# Patient Record
Sex: MALE | Race: WHITE
[De-identification: names, ages, dates, MRNs, and addresses within clinical notes are randomized per-mention and may not be internally consistent; named-entity substitution may affect disease eponyms.]

---

## 2019-09-20 ENCOUNTER — HOSPITAL ENCOUNTER (EMERGENCY)
Dept: HOSPITAL 25 - ED | Age: 54
Discharge: HOME | End: 2019-09-20
Payer: COMMERCIAL

## 2019-09-20 VITALS — SYSTOLIC BLOOD PRESSURE: 145 MMHG | DIASTOLIC BLOOD PRESSURE: 95 MMHG

## 2019-09-20 DIAGNOSIS — R19.7: ICD-10-CM

## 2019-09-20 DIAGNOSIS — Z88.4: ICD-10-CM

## 2019-09-20 DIAGNOSIS — R11.0: ICD-10-CM

## 2019-09-20 DIAGNOSIS — N20.1: Primary | ICD-10-CM

## 2019-09-20 LAB
ALBUMIN SERPL BCG-MCNC: 4.4 G/DL (ref 3.2–5.2)
ALBUMIN/GLOB SERPL: 2.1 {RATIO} (ref 1–3)
ALP SERPL-CCNC: 47 U/L (ref 34–104)
ALT SERPL W P-5'-P-CCNC: 43 U/L (ref 7–52)
ANION GAP SERPL CALC-SCNC: 5 MMOL/L (ref 2–11)
AST SERPL-CCNC: 22 U/L (ref 13–39)
BASOPHILS # BLD AUTO: 0 10^3/UL (ref 0–0.2)
BUN SERPL-MCNC: 19 MG/DL (ref 6–24)
BUN/CREAT SERPL: 13.8 (ref 8–20)
CALCIUM SERPL-MCNC: 9.3 MG/DL (ref 8.6–10.3)
CHLORIDE SERPL-SCNC: 108 MMOL/L (ref 101–111)
EOSINOPHIL # BLD AUTO: 0 10^3/UL (ref 0–0.6)
GLOBULIN SER CALC-MCNC: 2.1 G/DL (ref 2–4)
GLUCOSE SERPL-MCNC: 119 MG/DL (ref 70–100)
HCO3 SERPL-SCNC: 28 MMOL/L (ref 22–32)
HCT VFR BLD AUTO: 43 % (ref 42–52)
HGB BLD-MCNC: 14.8 G/DL (ref 14–18)
LYMPHOCYTES # BLD AUTO: 0.8 10^3/UL (ref 1–4.8)
MCH RBC QN AUTO: 32 PG (ref 27–31)
MCHC RBC AUTO-ENTMCNC: 35 G/DL (ref 31–36)
MCV RBC AUTO: 93 FL (ref 80–94)
MONOCYTES # BLD AUTO: 0.6 10^3/UL (ref 0–0.8)
NEUTROPHILS # BLD AUTO: 11.5 10^3/UL (ref 1.5–7.7)
NRBC # BLD AUTO: 0 10^3/UL
NRBC BLD QL AUTO: 0
PLATELET # BLD AUTO: 277 10^3/UL (ref 150–450)
POTASSIUM SERPL-SCNC: 4.1 MMOL/L (ref 3.5–5)
PROT SERPL-MCNC: 6.5 G/DL (ref 6.4–8.9)
RBC # BLD AUTO: 4.6 10^6 /UL (ref 4.18–5.48)
RBC UR QL AUTO: (no result)
SODIUM SERPL-SCNC: 141 MMOL/L (ref 135–145)
WBC # BLD AUTO: 13 10^3/UL (ref 3.5–10.8)
WBC UR QL AUTO: (no result)

## 2019-09-20 PROCEDURE — 81003 URINALYSIS AUTO W/O SCOPE: CPT

## 2019-09-20 PROCEDURE — 83605 ASSAY OF LACTIC ACID: CPT

## 2019-09-20 PROCEDURE — 99283 EMERGENCY DEPT VISIT LOW MDM: CPT

## 2019-09-20 PROCEDURE — 74018 RADEX ABDOMEN 1 VIEW: CPT

## 2019-09-20 PROCEDURE — 86140 C-REACTIVE PROTEIN: CPT

## 2019-09-20 PROCEDURE — 81015 MICROSCOPIC EXAM OF URINE: CPT

## 2019-09-20 PROCEDURE — 85025 COMPLETE CBC W/AUTO DIFF WBC: CPT

## 2019-09-20 PROCEDURE — 74177 CT ABD & PELVIS W/CONTRAST: CPT

## 2019-09-20 PROCEDURE — 87086 URINE CULTURE/COLONY COUNT: CPT

## 2019-09-20 PROCEDURE — 96374 THER/PROPH/DIAG INJ IV PUSH: CPT

## 2019-09-20 PROCEDURE — 96361 HYDRATE IV INFUSION ADD-ON: CPT

## 2019-09-20 PROCEDURE — 36415 COLL VENOUS BLD VENIPUNCTURE: CPT

## 2019-09-20 PROCEDURE — 80053 COMPREHEN METABOLIC PANEL: CPT

## 2019-09-20 PROCEDURE — 96375 TX/PRO/DX INJ NEW DRUG ADDON: CPT

## 2019-09-20 PROCEDURE — 96376 TX/PRO/DX INJ SAME DRUG ADON: CPT

## 2019-09-20 NOTE — HP
CC:  Dr. Hair; Dr. Bauer

 

ADMITTING HISTORY AND PHYSICAL:

 

DATE OF ADMISSION:  09/23/19

 

ADMITTING DIAGNOSES:

1.  Calculus, left ureteropelvic junction.

2.  Left hydronephrosis.

 

PLANNED PROCEDURE:  Left stent insertion and shockwave lithotripsy of calculus, left ureter.

 

SURGEON:  Dr. Bauer.

 

HISTORY OF PRESENT ILLNESS:  Mann Macias is a 53-year-old gentleman who has had increasing left fla
nk pain for the last several days associated with nausea and chills.  A CT scan revealed an approxima
tely 8 mm calculus in the area of the left ureteropelvic junction with left hydronephrosis.

 

PAST MEDICAL HISTORY:  Significant for:

1.  Gout.

2.  Environmental allergies.

 

PAST SURGICAL HISTORY:  Significant for vasectomy and removal of basal cell carcinoma from face.

 

MEDICATIONS ON ADMISSION:

1.  Singulair 1 tablet daily.

2.  Multiple vitamins and supplements.

 

ALLERGIES:  LIDOCAINE.

 

FAMILY HISTORY:  Negative for stones.

 

SOCIAL HISTORY:  Smoking history, nonsmoker.

 

REVIEW OF SYSTEMS:  Otherwise in excellent health.  There is no history of diabetes mellitus or any o
ther major systemic illness.

 

                               PHYSICAL EXAMINATION

 

GENERAL:  Reveals a pleasant, middle-aged gentleman.

 

VITAL SIGNS:  Blood pressure is 140/80, pulse 83 per minute and regular, temperature 97, oxygen satur
ation 98% on room air.

 

LUNGS:  Clear bilaterally.

 

CARDIOVASCULAR:  Regular rate and rhythm.  S1, S2.

 

ABDOMEN:  Soft with left flank tenderness.

 

 DIAGNOSTIC STUDIES/LAB DATA:  I reviewed the CT scan in the lab and had a detailed discussion with AMANDA mcclellan and Mrs. Macias regarding the diagnosis and treatment option.

 

IMPRESSION AND PLAN:  I discussed the procedure of left stent insertion and shockwave lithotripsy in 
detail.  I also discussed the small possibility that this may represent a uric acid stone (difficult 
to tell right now because of the intravenous contrast that was administered for his CAT scan) and in 
that case the plan will be a left stent insertion followed by medication to increase the urine pH to 
try to dissolve the calculus.  The most likely scenario is a calcium stone, in which case the plan wo
uld be left stent insertion and shockwave lithotripsy.  This decision will be made based on a KUB x-r
ay to be obtained prior to the procedure on Monday.

 

 

 

[id number]/CPS #: [id number]

## 2019-09-20 NOTE — XMS REPORT
Continuity of Care Document (CCD)

 Created on:2019



Patient:Mann Macias

Sex:Male

:1965

External Reference #:[id number]





Demographics







 Address  [address] [place] [address]

 

 Home Phone  [phone]

 

 Email Address  [email or web address]

 

 Preferred Language  en

 

 Marital Status  Not  or 

 

 Jainism Affiliation  Unknown

 

 Race  White

 

 Ethnic Group  Not  or 









Author







 Name  Awa Suh DNP, RN, FNP-BC (transmitted by agent of provider Haley Martínez)

 

 Address  [address], [address]



   [place] [address]









Care Team Providers







 Name  Role  Phone

 

 Cristóbal Hair MD - Family Medicine  Care Team Information   [phone]
[phone]









Problems







 Active Problems  Provider  Date

 

 Obstructive sleep apnea of adult  Awa Suh DNP, RN, FNP-BC  Onset: 







Social History







 Type  Date  Description  Comments

 

 Birth Sex    Unknown  

 

 ETOH Use    Drinks Alcoholic Beverages  



     Occasionally  

 

 Tobacco Use  Start: Unknown  Patient has never smoked  

 

 Recreational Drug Use    Denies Drug Use  

 

 Smoking Status  Reviewed: 19  Patient has never smoked  

 

 Exercise Type/Frequency    Does not exercise  







Allergies, Adverse Reactions, Alerts







 Description

 

 No Known Drug Allergies







Medications







 Active Medications  SIG  Qnty  Indications  Ordering Provider  Date

 

 Allegra Allergy  as directed by      Unknown  2016



   mouth        

 

 Claritin  as directed by      Unknown  2016



   mouth        

 

 Multi Vitamin Mens  every day by mouth      Unknown  2016

 

 Magnesium        Unknown  

 

 Glucosamine        Unknown  



 Chondroitin Complex          







Immunizations







 Description

 

 No Information Available







Vital Signs







 Date  Vital  Result  Comment

 

 2019  1:46pm  Height  68 inches  5'8"









 Weight  196.38 lb  

 

 Heart Rate  64 /min  

 

 BP Systolic Sitting  122 mmHg  Lue large cuff

 

 BP Diastolic Sitting  80 mmHg  Lue large cuff

 

 Respiratory Rate  16 /min  

 

 O2 % BldC Oximetry  97 %  On Ra

 

 BMI (Body Mass Index)  29.9 kg/m2  









 2018  1:33pm  Height  68 inches  5'8"









 Weight  194.00 lb  

 

 Heart Rate  64 /min  

 

 BP Systolic Sitting  148 mmHg  Lue reg cuff

 

 BP Diastolic Sitting  80 mmHg  Lue reg cuff

 

 Respiratory Rate  16 /min  

 

 O2 % BldC Oximetry  97 %  On Ra

 

 BMI (Body Mass Index)  29.5 kg/m2  







Results







 Description

 

 No Information Available







Procedures







 Description

 

 No Information Available







Medical Devices







 Description

 

 No Information Available







Encounters







 Description

 

 No Information Available







Assessments







 Date  Code  Description  Provider

 

 2019  G47.33  Obstructive sleep apnea (adult)  Awa Suh DNP, RN, 
FNP-BC



     (pediatric)  







Plan of Treatment

2019 - Awa Suh DNP, RN, FNP-BCG47.33 Obstructive sleep apnea (
adult) (pediatric)Comments:Sleep Apnea - NPSG  3/2/09  wt 185  BMI 28   AHI 52.3
/hour,  oxygen lisa 87% On CPAP AHI 0.9/hour, normalFollow up:1 
yearRecommendations:Continue PAP device, Benefitting and compliant with 
treatment.  Cleaning Wipe off mask daily (baby wipe-no scent, or warm water) 
Clean mask, tubing, filter, and water chamber weekly in mild no scent dish soap 
and water. Hang to dry.    If you have any sleepiness while driving you MUST 
avoid operating a vehicle or machinery. If you have difficulty with your 
equipment, or need to replace your mask or hoses, please contact your homecare 
agency. A weight change of 20 pounds or more may have an effect onyour equipment
; if you are experiencing problems please call for an appointment.  If you have 
any further questions, please call the [facility] at [phone].



Functional Status







 Description

 

 No Information Available







Mental Status







 Description

 

 No Information Available







Referrals







 Description

 

 No Information Available

## 2019-09-20 NOTE — ED
Abdominal Pain/Male





- HPI Summary


HPI Summary: 


Patient is a 53-year-old male who presents emergency department for left-sided 

abdominal pain that started yesterday.  Patient notes pain is sharp and 

cramping in nature and constant.  No modifying factors.  Associated symptoms of 

nausea and diarrhea.  Denies fever, chest pain, shortness of breath, urinary 

symptoms, testicular pain.  No past medical history.  Symptoms are moderate in 

severity.








- History of Current Complaint


Chief Complaint: EDAbdPain


Stated Complaint: ABD PAIN PER PT


Time Seen by Provider: 09/20/19 06:30


Hx Obtained From: Patient


Pain Intensity: 8





- Allergies/Home Medications


Allergies/Adverse Reactions: 


 Allergies











Allergy/AdvReac Type Severity Reaction Status Date / Time


 


lidocaine Allergy  Dizziness Verified 09/20/19 06:18











Home Medications: 


 Home Medications





Glucosam/Chondr/Collagn/Hyalur [Glucosamine & Chondroitin Cap] 1 tab PO DAILY 09 /20/19 [History Confirmed 09/20/19]


Magnesium Oxide [Magnesium] 250 mg PO DAILY 09/20/19 [History Confirmed 09/20/19

]


Montelukast Sodium 10 mg PO DAILY 09/20/19 [History Confirmed 09/20/19]











PMH/Surg Hx/FS Hx/Imm Hx


Previously Healthy: Yes


Endocrine/Hematology History: 


   Denies: Hx Diabetes


Cardiovascular History: 


   Denies: Hx Hypertension, Hx Pacemaker/ICD


Respiratory History: Reports: Hx Seasonal Allergies, Hx Sleep Apnea


 History: 


   Denies: Hx Renal Disease


Sensory History: 


   Denies: Hx Hearing Aid


Neurological History: 


   Denies: Other Neuro Impairments/Disorders


Psychiatric History: 


   Denies: Hx Panic Disorder





- Cancer History


Cancer Type, Location and Year: BASAL CELL -SKIN - S/P 3 YRS





- Surgical History


Surgery Procedure, Year, and Place: Lateral Internal Sphincterotomy


Infectious Disease History: No


Infectious Disease History: 


   Denies: Traveled Outside the [place] in Last 30 Days





- Family History


Known Family History: Positive: Non-Contributory





- Social History


Occupation: Employed Full-time


Lives: With Family





Review of Systems


Positive: Chills.  Negative: Fever


Cardiovascular: Negative


Respiratory: Negative


Positive: Abdominal Pain, Diarrhea, Nausea.  Negative: Vomiting


Genitourinary: Negative


Negative: dysuria, flank pain


Skin: Negative


Neurological: Negative


All Other Systems Reviewed And Are Negative: Yes





Physical Exam


Triage Information Reviewed: Yes


Vital Signs On Initial Exam: 


 Initial Vitals











Temp Pulse Resp BP Pulse Ox


 


 98.1 F   72   18   164/99   97 


 


 09/20/19 06:16  09/20/19 06:16  09/20/19 06:16  09/20/19 06:16  09/20/19 06:16











Vital Signs Reviewed: Yes


Appearance: Positive: Well-Appearing - Pt. lying in bed in NAD. Wife present.


Skin: Positive: Warm, Dry


Head/Face: Positive: Normal Head/Face Inspection


Eyes: Positive: Normal, EOMI


Neck: Positive: Supple


Respiratory/Lung Sounds: Positive: Clear to Auscultation, Breath Sounds Present


Cardiovascular: Positive: Normal, RRR


Abdomen Description: Positive: Other: - Abd. is soft with pain on palpation to 

LLQ and mild left CVA tenderness. No rebound or guarding.


Musculoskeletal: Positive: Normal, Strength/ROM Intact


Neurological: Positive: Normal, Alert, Oriented to Person Place, Time


Psychiatric: Positive: Affect/Mood Appropriate





Diagnostics





- Vital Signs


 Vital Signs











  Temp Pulse Resp BP Pulse Ox


 


 09/20/19 06:16  98.1 F  72  18  164/99  97














- Laboratory


Result Diagrams: 


 09/20/19 07:34





 09/20/19 07:34


Lab Statement: Any lab studies that have been ordered have been reviewed, and 

results considered in the medical decision making process.





Abdominal Pain Male Course/Dx





- Course


Course Of Treatment: Pt. presenting with worsening LLQ pain. He is afebrile. 

Will obtain labs and CT scan for further evaluation. Pt. given IV fluids, 

zofran and morphine.  CT per radiolgoy: IMPRESSION:  1.  THERE IS A 8 MM 

OBSTRUCTIVE CALCULUS NEAR THE URETEROPELVIC JUNCTION ON THE LEFT.  THERE IS 

MILD LEFT PERIRENAL STRANDING WITH A DELAYED NEPHROGRAM.  Pain and nausea 

returned after CT Scan. Pt. given a dose of toradol and another dose of 

morphine and zofran. CBC shows mild leukocytosis. Cr elevated, U/A shows RBCs 

wthout infection. Lactic acid 2.1. On re-exam pt.'s pain is 0/10. Case 

discussed with Dr. Bauer who requested KUB. Imaging reviewed. Dr. Bauer states 

pt. will need a stent and lithotripsy. Dr. Bauer can place a stent today and 

then have pt. f.u for litho. next week or he can see pt. in the office today 

and then schedule him for stent and litho together on Monday. Pt. would prefer 

to have both procedures on Monday. Percocet and zofran rx.  reviewed. Pt. to 

go to Dr. Bauer's office directly upon dc. To return to ER over weekend for 

fever or uncontrolled pain/vomiting. Pt. and wife understand and agree with 

plan.





- Diagnoses


Differential Diagnosis/HQI/PQRI: Diverticulitis, Prostatitis, Renal Colic, 

Ureteral Stone, Urinary Tract Infection


Provider Diagnoses: 


 Urolithiasis








Discharge ED





- Sign-Out/Discharge


Documenting (check all that apply): Patient Departure


Patient Received Moderate/Deep Sedation with Procedure: No





- Discharge Plan


Condition: Improved


Disposition: HOME


Prescriptions: 


Ondansetron TAB* [Zofran 4 MG Tab*] 4 mg PO Q6H PRN #12 tab


 PRN Reason: Nausea


oxyCODONE/Acetamin 5/325 MG* [Percocet 5/325 TAB*] 1 tab PO Q6H PRN #20 tab MDD 

4


 PRN Reason: Pain - Severe


Patient Education Materials:  Kidney Stones (ED)


Referrals: 


Cristóbal Hair MD [Primary Care Provider] - 


Sandro Bauer MD [Medical Doctor] - 


Additional Instructions: 


Please go directly to Dr. Bauer's office upon discharge from the ED


Pain medication as directed


Ibuprofen as directed


Increase fluids


Return to ER for uncontrolled pain, vomiting, fever, or if concerned





- Billing Disposition and Condition


Condition: IMPROVED


Disposition: Home

## 2019-09-23 ENCOUNTER — HOSPITAL ENCOUNTER (OUTPATIENT)
Dept: HOSPITAL 25 - OR | Age: 54
Discharge: HOME | End: 2019-09-23
Attending: UROLOGY
Payer: COMMERCIAL

## 2019-09-23 VITALS — DIASTOLIC BLOOD PRESSURE: 85 MMHG | SYSTOLIC BLOOD PRESSURE: 135 MMHG

## 2019-09-23 DIAGNOSIS — J30.89: ICD-10-CM

## 2019-09-23 DIAGNOSIS — N13.2: Primary | ICD-10-CM

## 2019-09-23 DIAGNOSIS — Z85.828: ICD-10-CM

## 2019-09-23 DIAGNOSIS — M10.9: ICD-10-CM

## 2019-09-23 PROCEDURE — C1876 STENT, NON-COA/NON-COV W/DEL: HCPCS

## 2019-09-23 PROCEDURE — 74018 RADEX ABDOMEN 1 VIEW: CPT

## 2019-09-24 NOTE — OP
DATE OF OPERATION:  09/23/19 - [facility]

 

DATE OF BIRTH:  [birth year]

 

SURGEON:  Sandro Bauer MD.

 

ANESTHESIOLOGIST:  Dr. Taylor.

 

ANESTHESIA:  General.

 

PRE-OP DIAGNOSES:

1.  Calculus, left ureteropelvic junction.

2.  Left hydronephrosis.

 

POST-OP DIAGNOSES:

1.  Calculus, left ureteropelvic junction.

2.  Left hydronephrosis.

 

OPERATIVE PROCEDURE:

1.  Shock wave lithotripsy of calculus, left proximal ureter.

2.  Cystoscopy, left retrograde and left stent insertion .

 

COMPLICATIONS:  None.

 

STENT USED:  #6 Austrian stent left ureter.

 

POSTOPERATIVE CONDITION:  Stable.

 

OPERATIVE FINDINGS:  Left hydronephrosis secondary to approximately 8 mm 
calculus left ureteropelvic junction.

 

INDICATIONS:  Mann Macias is a 53-year-old gentleman who has had episodic 
left flank pain secondary to a calculus at the left ureteropelvic junction.

 

DESCRIPTION OF PROCEDURE:  After induction of general anesthesia, the patient 
was placed on the lithotripsy table in supine position.  The calculus in the 
left ureteropelvic junction was localized using fluoroscopy.  Shock wave 
lithotripsy was commenced at a rate of 90 shocks per minute.  Periodic imaging 
revealed good localization and fragmentation and a total of 1200 shocks were 
administered to the calculus.

 

Next, the patient was placed in dorsal lithotomy position and cystoscopy was 
performed. Mild strictures were noted in the bulbar urethra with mild 
enlargement of the prostate.  The bladder was examined and appeared normal.  
Left retrograde pyelogram revealed left hydronephrosis and a 6-Austrian stent was 
introduced and positioned under fluoroscopy with good proximal and distal 
positioning obtained. The bladder was emptied. The patient tolerated the 
procedure satisfactorily and was transferred back to the recovery area in 
stable condition.

 

 [id number]/CPS #: [id number]

MTDD